# Patient Record
Sex: MALE | Race: WHITE
[De-identification: names, ages, dates, MRNs, and addresses within clinical notes are randomized per-mention and may not be internally consistent; named-entity substitution may affect disease eponyms.]

---

## 2021-06-22 ENCOUNTER — HOSPITAL ENCOUNTER (OUTPATIENT)
Dept: HOSPITAL 47 - RADCTMAIN | Age: 56
Discharge: HOME | End: 2021-06-22
Payer: COMMERCIAL

## 2021-06-22 DIAGNOSIS — H91.91: Primary | ICD-10-CM

## 2021-06-22 PROCEDURE — 70480 CT ORBIT/EAR/FOSSA W/O DYE: CPT

## 2021-06-23 NOTE — CT
EXAMINATION TYPE: CT iac wo con

 

DATE OF EXAM: 6/22/2021

 

COMPARISON: None

 

HISTORY: ringing and hearing loss to right ear

 

CT DLP: 150 mGycm

Automated exposure control for dose reduction was used.

 

Images obtained from the bottom of the skull to the sella turcica without contrast.

 

There is fairly normal aeration of the mastoid sinuses. The external auditory canals appear fairly no
rmal. There is bilateral normal aeration of the epitympanic recess. I see no bony destructive process
. The cochlea and semicircular canals appear intact. The internal auditory canals are symmetric. Ther
e is no evidence of cerebellopontine angle mass. I see no bony destructive process.

 

IMPRESSION:

Normal CT scan of the temporal bones.